# Patient Record
Sex: FEMALE | Race: WHITE | Employment: FULL TIME | URBAN - METROPOLITAN AREA
[De-identification: names, ages, dates, MRNs, and addresses within clinical notes are randomized per-mention and may not be internally consistent; named-entity substitution may affect disease eponyms.]

---

## 2017-04-05 PROCEDURE — 87591 N.GONORRHOEAE DNA AMP PROB: CPT | Performed by: OBSTETRICS & GYNECOLOGY

## 2017-04-05 PROCEDURE — 87491 CHLMYD TRACH DNA AMP PROBE: CPT | Performed by: OBSTETRICS & GYNECOLOGY

## 2017-06-23 PROCEDURE — 81001 URINALYSIS AUTO W/SCOPE: CPT | Performed by: FAMILY MEDICINE

## 2019-10-01 PROCEDURE — 88175 CYTOPATH C/V AUTO FLUID REDO: CPT | Performed by: OBSTETRICS & GYNECOLOGY

## 2019-10-01 PROCEDURE — 87624 HPV HI-RISK TYP POOLED RSLT: CPT | Performed by: OBSTETRICS & GYNECOLOGY

## 2024-05-07 ENCOUNTER — OFFICE VISIT (OUTPATIENT)
Dept: FAMILY MEDICINE CLINIC | Facility: CLINIC | Age: 39
End: 2024-05-07
Payer: COMMERCIAL

## 2024-05-07 VITALS
HEIGHT: 60 IN | BODY MASS INDEX: 20.81 KG/M2 | HEART RATE: 82 BPM | WEIGHT: 106 LBS | OXYGEN SATURATION: 99 % | TEMPERATURE: 99 F | SYSTOLIC BLOOD PRESSURE: 104 MMHG | DIASTOLIC BLOOD PRESSURE: 68 MMHG | RESPIRATION RATE: 16 BRPM

## 2024-05-07 DIAGNOSIS — L08.9 SKIN INFECTION: ICD-10-CM

## 2024-05-07 DIAGNOSIS — L55.1 SUNBURN, BLISTERING: Primary | ICD-10-CM

## 2024-05-07 PROCEDURE — 3074F SYST BP LT 130 MM HG: CPT | Performed by: NURSE PRACTITIONER

## 2024-05-07 PROCEDURE — 99203 OFFICE O/P NEW LOW 30 MIN: CPT | Performed by: NURSE PRACTITIONER

## 2024-05-07 PROCEDURE — 3078F DIAST BP <80 MM HG: CPT | Performed by: NURSE PRACTITIONER

## 2024-05-07 PROCEDURE — 3008F BODY MASS INDEX DOCD: CPT | Performed by: NURSE PRACTITIONER

## 2024-05-07 RX ORDER — AMOXICILLIN AND CLAVULANATE POTASSIUM 875; 125 MG/1; MG/1
1 TABLET, FILM COATED ORAL 2 TIMES DAILY
Qty: 14 TABLET | Refills: 0 | Status: SHIPPED | OUTPATIENT
Start: 2024-05-07 | End: 2024-05-14

## 2024-05-07 NOTE — PROGRESS NOTES
CHIEF COMPLAINT:     Chief Complaint   Patient presents with    Sunburn     Felt irritation 2 days ago, blistering and swelling on ankles and feet started yesterday, worsening, applied cold compresses, baking soda, oatmeal soak        HPI:     Dinah Ryan is a 38 year old female who presents with concerns of possible skin infection. Patient first noticed symptoms 1 day ago.  Reports erythema,edema of top of feet, ankles to mid-calf, increased warmth, some tenderness to palpation, and scant drainage from bilatered area on left ankle.  Symptoms have been worsening since onset. Bilateral lower legs were sun burned, following returning home pt had gone for a long walk wearing socks and gym shoes, which irritated the blisters which began draining and shortly after she noticed marked edema of bilateral ankles with more edema on left ankle.   Affected location includes: left ankle.      Precipitating event: sunburn with blistering, walking.  Treatments: cool compresses, baking soda and oatmeal foot soaks.  No other associated symptoms.  Reports subjective fever and chills. Denies calf pain, throbbing calves, no shortness of breath or chest pain.        Current Outpatient Medications   Medication Sig Dispense Refill    amoxicillin clavulanate 875-125 MG Oral Tab Take 1 tablet by mouth 2 (two) times daily for 7 days. 14 tablet 0    silver sulfADIAZINE 1 % External Cream Apply to bilateral lower extremities 2 times per day until healed. 25 g 1    sertraline 50 MG Oral Tab Take 1 tablet (50 mg total) by mouth daily. 90 tablet 1    SUMAtriptan 50 MG Oral Tab Take 1 tablet (50 mg total) by mouth every 2 (two) hours as needed for Migraine. 8 tablet 3    Zolpidem Tartrate 10 MG Oral Tab Take 1 tablet (10 mg total) by mouth nightly as needed for Sleep. AT BEDTIME 30 tablet 0    Cefuroxime Axetil (CEFTIN) 250 MG Oral Tab Take 1 tablet (250 mg total) by mouth 2 (two) times daily. 28 tablet 0    Albuterol Sulfate  (90  Base) MCG/ACT Inhalation Aero Soln Inhale 2 puffs prior to exercise 1 Inhaler 0    FLUTICASONE PROPIONATE 50 MCG/ACT Nasal Suspension SHAKE LIQUID AND USE 2 SPRAYS NASALLY DAILY 16 g 0      Past Medical History:    Cervix dysplasia    Palpitations      Social History:  Social History     Socioeconomic History    Marital status: Single    Highest education level: 1st grade   Occupational History    Occupation: teacher   Tobacco Use    Smoking status: Never    Smokeless tobacco: Never   Vaping Use    Vaping status: Never Used   Substance and Sexual Activity    Alcohol use: Yes     Alcohol/week: 0.0 standard drinks of alcohol     Comment: rare    Drug use: Not Currently     Types: Cannabis     Comment: Medical Card used for migraines    Sexual activity: Yes     Partners: Male     Birth control/protection: Condom     Comment: new relationship 2017   Other Topics Concern     Service No    Blood Transfusions No    Caffeine Concern No    Occupational Exposure No    Hobby Hazards No    Sleep Concern No    Stress Concern No    Weight Concern No    Special Diet No    Back Care No    Exercise Yes    Seat Belt Yes     Social Determinants of Health     Financial Resource Strain: Low Risk  (3/25/2019)    Received from [facility]    Overall Financial Resource Strain (CARDIA)     Difficulty of Paying Living Expenses: Not hard at all   Food Insecurity: Food Insecurity Present (3/25/2019)    Received from [facility]    Hunger Vital Sign     Worried About Running Out of Food in the Last Year: Sometimes true     Ran Out of Food in the Last Year: Sometimes true        REVIEW OF SYSTEMS:   GENERAL: see HPI  SKIN: as above.  CHEST: no chest pains, no palpitations.  LUNGS: denies shortness of breath with exertion or rest. No wheezing, no cough.  LYMPH: no enlargement of the lymph nodes.  MUSC/SKEL: see HPI.  NEURO: no abnormal sensation, no tingling of the skin or numbness.    EXAM:   /68   Pulse 82    Temp 99.3 °F (37.4 °C)   Resp 16   Ht 5' (1.524 m)   Wt 106 lb (48.1 kg)   LMP 04/15/2024 (Approximate)   SpO2 99%   BMI 20.70 kg/m²   GENERAL: well developed, well nourished,in no apparent distress  SKIN: scattered crusting blisters on left ankle with dried clear fluid noted. Bilaeral lower extremities sunburned and warm to touch.  HEAD: atraumatic, normocephalic  EYES: conjunctiva clear, EOM intact  NOSE: Normal external nose.  No rhinorrhea.  NECK: supple, non-tender  LUNGS: clear to auscultation bilaterally, no wheezes or rhonchi. Breathing is non labored.  CARDIO: RRR without murmur  EXTREMITIES: no cyanosis, clubbing or edema.  Cap refill brisk- less than 2 seconds.   LYMPH: no lymphadenopathy.      ASSESSMENT AND PLAN:   Will begin oral antibiotic for possible skin infection due to subjective fever and chills and pain at site of blistering. Silvadene cream to sunburned legs.   ASSESSMENT:  Encounter Diagnoses   Name Primary?    Sunburn, blistering Yes    Skin infection        PLAN:   Skin care discussed with patient. Instructions and Comfort Care as listed in Patient Instructions.    Medication as below.  Discussed importance of keeping lower extremities elevated above heart, and following up if no improvement in wound after 48 hours on abx.   Discussed red flags: chest pain , shortness of breath, sharp calf pain and being seen at ED asap if these symptoms occur.   Discussed probiotics when taking abx and finishing abx even if wound is healing.   Discussed resting and remaining hydrated with water.  Please observe your infection closely.  If you develop worsening or changing symptoms, redness that increases, increase in pain, new or worsening fever, red streaks going up from wound, increase in drainage, or lack of improvement with treatment given please seek immediate care at the ER as these can be signs of more serious illness requiring emergent evaluation and treatment.    Requested Prescriptions      Signed Prescriptions Disp Refills    amoxicillin clavulanate 875-125 MG Oral Tab 14 tablet 0     Sig: Take 1 tablet by mouth 2 (two) times daily for 7 days.    silver sulfADIAZINE 1 % External Cream 25 g 1     Sig: Apply to bilateral lower extremities 2 times per day until healed.       The patient indicates understanding of these issues and agrees to the plan.  The patient is asked to return in 3 days if sx's persist or worsen.